# Patient Record
Sex: FEMALE | Race: WHITE
[De-identification: names, ages, dates, MRNs, and addresses within clinical notes are randomized per-mention and may not be internally consistent; named-entity substitution may affect disease eponyms.]

---

## 2019-03-28 ENCOUNTER — HOSPITAL ENCOUNTER (EMERGENCY)
Dept: HOSPITAL 4 - SED | Age: 49
Discharge: HOME | End: 2019-03-28
Payer: MEDICAID

## 2019-03-28 VITALS — SYSTOLIC BLOOD PRESSURE: 134 MMHG

## 2019-03-28 VITALS — HEIGHT: 67 IN | WEIGHT: 200 LBS | BODY MASS INDEX: 31.39 KG/M2

## 2019-03-28 VITALS — SYSTOLIC BLOOD PRESSURE: 109 MMHG

## 2019-03-28 DIAGNOSIS — A05.9: Primary | ICD-10-CM

## 2019-03-28 DIAGNOSIS — R03.0: ICD-10-CM

## 2019-03-28 LAB
ALBUMIN SERPL BCP-MCNC: 4.1 G/DL (ref 3.4–4.8)
ALT SERPL W P-5'-P-CCNC: 50 U/L (ref 12–78)
ANION GAP SERPL CALCULATED.3IONS-SCNC: 9 MMOL/L (ref 5–15)
AST SERPL W P-5'-P-CCNC: 37 U/L (ref 10–37)
BASOPHILS # BLD AUTO: 0 K/UL (ref 0–0.2)
BASOPHILS NFR BLD AUTO: 0.3 % (ref 0–2)
BILIRUB SERPL-MCNC: 0.5 MG/DL (ref 0–1)
BUN SERPL-MCNC: 12 MG/DL (ref 8–21)
CALCIUM SERPL-MCNC: 9.9 MG/DL (ref 8.4–11)
CHLORIDE SERPL-SCNC: 97 MMOL/L (ref 98–107)
CREAT SERPL-MCNC: 0.75 MG/DL (ref 0.55–1.3)
EOSINOPHIL # BLD AUTO: 0.1 K/UL (ref 0–0.4)
EOSINOPHIL NFR BLD AUTO: 0.4 % (ref 0–4)
ERYTHROCYTE [DISTWIDTH] IN BLOOD BY AUTOMATED COUNT: 13.5 % (ref 9–15)
GFR SERPL CREATININE-BSD FRML MDRD: 107 ML/MIN (ref 90–?)
GLUCOSE SERPL-MCNC: 124 MG/DL (ref 70–99)
HCT VFR BLD AUTO: 43.5 % (ref 36–48)
HGB BLD-MCNC: 14.5 G/DL (ref 12–16)
LYMPHOCYTES # BLD AUTO: 1.2 K/UL (ref 1–5.5)
LYMPHOCYTES NFR BLD AUTO: 7.4 % (ref 20.5–51.5)
MCH RBC QN AUTO: 29 PG (ref 27–31)
MCHC RBC AUTO-ENTMCNC: 33 % (ref 32–36)
MCV RBC AUTO: 86 FL (ref 79–98)
MONOCYTES # BLD MANUAL: 1.2 K/UL (ref 0–1)
MONOCYTES # BLD MANUAL: 7.8 % (ref 1.7–9.3)
NEUTROPHILS # BLD AUTO: 13.2 K/UL (ref 1.8–7.7)
NEUTROPHILS NFR BLD AUTO: 84.1 % (ref 40–70)
PLATELET # BLD AUTO: 321 K/UL (ref 130–430)
POTASSIUM SERPL-SCNC: 3.8 MMOL/L (ref 3.5–5.1)
RBC # BLD AUTO: 5.07 MIL/UL (ref 4.2–6.2)
SODIUM SERPLBLD-SCNC: 130 MMOL/L (ref 136–145)
WBC # BLD AUTO: 15.7 K/UL (ref 4.8–10.8)

## 2019-03-28 PROCEDURE — 96361 HYDRATE IV INFUSION ADD-ON: CPT

## 2019-03-28 PROCEDURE — 36415 COLL VENOUS BLD VENIPUNCTURE: CPT

## 2019-03-28 PROCEDURE — 85025 COMPLETE CBC W/AUTO DIFF WBC: CPT

## 2019-03-28 PROCEDURE — 99283 EMERGENCY DEPT VISIT LOW MDM: CPT

## 2019-03-28 PROCEDURE — 96374 THER/PROPH/DIAG INJ IV PUSH: CPT

## 2019-03-28 PROCEDURE — 80053 COMPREHEN METABOLIC PANEL: CPT

## 2019-03-28 NOTE — NUR
Pt alert and oriented. Pt C/O N/V/D after eating shrimp earlier this afternoon. 
VSS. No signs of SOB or acute distress noted. Denies pain at this time.  Family 
at bedside. Will continue to monitor.

## 2019-03-28 NOTE — NUR
# 22 gauge angiocath placed to LAC.  Use of asceptic technique.  Opsite placed 
over site.  Blood return noted.  Blood for lab drawn from site.  Flushed with 
10 cc of normal saline.  No evidence of infiltration noted.  Patient tolerated 
well.

## 2019-03-28 NOTE — NUR
Patient given written and verbal discharge instructions and verbalizes 
understanding. ER MD Miguel discussed with patient the results and treatment 
provided. Patient in stable condition. ID arm band removed. IV catheter removed 
intact and dressing applied, no active bleeding. Rx of Zofran given. Patient 
educated on pain management and to follow up with PMD. Pain Scale 0/10. 
Opportunity for questions provided and answered. Medication side effect fact 
sheet provided.

## 2019-09-24 ENCOUNTER — HOSPITAL ENCOUNTER (EMERGENCY)
Dept: HOSPITAL 4 - SED | Age: 49
Discharge: HOME | End: 2019-09-24
Payer: MEDICAID

## 2019-09-24 VITALS — SYSTOLIC BLOOD PRESSURE: 160 MMHG

## 2019-09-24 VITALS — HEIGHT: 67 IN | BODY MASS INDEX: 31.23 KG/M2 | WEIGHT: 199 LBS

## 2019-09-24 DIAGNOSIS — J06.9: Primary | ICD-10-CM

## 2019-09-24 DIAGNOSIS — R03.0: ICD-10-CM

## 2019-09-24 NOTE — NUR
Patient given written and verbal discharge instructions and verbalizes 
understanding.  ER NP discussed with patient the results and treatment 
provided. Patient in stable condition. ID arm band removed. 

Rx of Medrol,motrin and promethazine with codeine   given. Patient educated on 
pain management and to follow up with PMD. Pain Scale 0/10

Opportunity for questions provided and answered. Medication side effect fact 
sheet provided.

## 2019-09-24 NOTE — NUR
Patient compalins of dry cough with nasal and chest congestion, body aches, and 
subjective chills x 3 days.    Pain 0/10. No other complaints/injuries per 
patient or as noted. Will continue to monitor.

## 2019-10-28 ENCOUNTER — HOSPITAL ENCOUNTER (INPATIENT)
Dept: HOSPITAL 4 - SED | Age: 49
Discharge: LEFT BEFORE DISCHARGE | DRG: 426 | End: 2019-10-28
Attending: FAMILY MEDICINE | Admitting: FAMILY MEDICINE
Payer: MEDICAID

## 2019-10-28 VITALS — SYSTOLIC BLOOD PRESSURE: 103 MMHG

## 2019-10-28 VITALS — SYSTOLIC BLOOD PRESSURE: 101 MMHG

## 2019-10-28 VITALS — BODY MASS INDEX: 31.39 KG/M2 | HEIGHT: 67 IN | WEIGHT: 200 LBS | SYSTOLIC BLOOD PRESSURE: 139 MMHG

## 2019-10-28 VITALS — SYSTOLIC BLOOD PRESSURE: 97 MMHG

## 2019-10-28 DIAGNOSIS — M41.9: ICD-10-CM

## 2019-10-28 DIAGNOSIS — E87.1: Primary | ICD-10-CM

## 2019-10-28 DIAGNOSIS — Z53.29: ICD-10-CM

## 2019-10-28 DIAGNOSIS — E87.6: ICD-10-CM

## 2019-10-28 DIAGNOSIS — R73.9: ICD-10-CM

## 2019-10-28 LAB
ALBUMIN SERPL BCP-MCNC: 3.9 G/DL (ref 3.4–4.8)
ALT SERPL W P-5'-P-CCNC: 42 U/L (ref 12–78)
ANION GAP SERPL CALCULATED.3IONS-SCNC: 14 MMOL/L (ref 5–15)
AST SERPL W P-5'-P-CCNC: 32 U/L (ref 10–37)
BASOPHILS # BLD AUTO: 0.1 K/UL (ref 0–0.2)
BASOPHILS NFR BLD AUTO: 1 % (ref 0–2)
BILIRUB SERPL-MCNC: 0.5 MG/DL (ref 0–1)
BUN SERPL-MCNC: 16 MG/DL (ref 8–21)
CALCIUM SERPL-MCNC: 9.3 MG/DL (ref 8.4–11)
CHLORIDE SERPL-SCNC: 97 MMOL/L (ref 98–107)
CREAT SERPL-MCNC: 0.64 MG/DL (ref 0.55–1.3)
EOSINOPHIL # BLD AUTO: 0 K/UL (ref 0–0.4)
EOSINOPHIL NFR BLD AUTO: 0.2 % (ref 0–4)
ERYTHROCYTE [DISTWIDTH] IN BLOOD BY AUTOMATED COUNT: 18.1 % (ref 9–15)
GFR SERPL CREATININE-BSD FRML MDRD: 127 ML/MIN (ref 90–?)
GLUCOSE SERPL-MCNC: 161 MG/DL (ref 70–99)
HCT VFR BLD AUTO: 38.6 % (ref 36–48)
HGB BLD-MCNC: 13 G/DL (ref 12–16)
LYMPHOCYTES # BLD AUTO: 4.3 K/UL (ref 1–5.5)
LYMPHOCYTES NFR BLD AUTO: 51.2 % (ref 20.5–51.5)
MCH RBC QN AUTO: 27 PG (ref 27–31)
MCHC RBC AUTO-ENTMCNC: 34 % (ref 32–36)
MCV RBC AUTO: 81 FL (ref 79–98)
MONOCYTES # BLD MANUAL: 0.2 K/UL (ref 0–1)
MONOCYTES # BLD MANUAL: 2.7 % (ref 1.7–9.3)
NEUTROPHILS # BLD AUTO: 3.8 K/UL (ref 1.8–7.7)
NEUTROPHILS NFR BLD AUTO: 44.9 % (ref 40–70)
PLATELET # BLD AUTO: 298 K/UL (ref 130–430)
POTASSIUM SERPL-SCNC: 2.6 MMOL/L (ref 3.5–5.1)
RBC # BLD AUTO: 4.76 MIL/UL (ref 4.2–6.2)
SODIUM SERPLBLD-SCNC: 132 MMOL/L (ref 136–145)
WBC # BLD AUTO: 8.4 K/UL (ref 4.8–10.8)

## 2019-11-16 ENCOUNTER — HOSPITAL ENCOUNTER (EMERGENCY)
Dept: HOSPITAL 4 - SED | Age: 49
LOS: 1 days | Discharge: HOME | End: 2019-11-17
Payer: MEDICAID

## 2019-11-16 VITALS — WEIGHT: 200 LBS | BODY MASS INDEX: 31.39 KG/M2 | HEIGHT: 67 IN

## 2019-11-16 VITALS — SYSTOLIC BLOOD PRESSURE: 120 MMHG

## 2019-11-16 DIAGNOSIS — J20.9: Primary | ICD-10-CM

## 2019-11-17 VITALS — SYSTOLIC BLOOD PRESSURE: 120 MMHG
